# Patient Record
Sex: FEMALE | Race: WHITE | NOT HISPANIC OR LATINO | Employment: FULL TIME | URBAN - NONMETROPOLITAN AREA
[De-identification: names, ages, dates, MRNs, and addresses within clinical notes are randomized per-mention and may not be internally consistent; named-entity substitution may affect disease eponyms.]

---

## 2021-12-08 PROBLEM — I47.10 PAROXYSMAL SVT (SUPRAVENTRICULAR TACHYCARDIA): Status: ACTIVE | Noted: 2021-12-08

## 2021-12-08 PROBLEM — I47.1 PAROXYSMAL SVT (SUPRAVENTRICULAR TACHYCARDIA): Status: ACTIVE | Noted: 2021-12-08

## 2023-07-17 NOTE — H&P
This 23 year old patient presents for U/S and abnormal bleeding.      History of Present Illness:  1.  U/S   Pt states they have been bleeding for over a month, with clots.  Periods are typically irregular. Has been bleeding off and on for three months. Passing large clots.  Was placed on Depo provera when she was a senior that stopped her bleeeding.  Has tried several different kinds of OCPs that have not helped.  This has been a chronic problem for her.    US showed a thickened endometrium.      G0.    I think that given her obesity this is most likely from unopposed estrogen.  Given the fact that she has had 3 months of bleeding and her endometrium is still thickened I think she probably does need a D&C and we will place an IUD at the same time.  We discussed the procedure risks and benefits in detail.      2.  abnormal bleeding             Screening Tools  Other Screenings  Encounter Date Performed Date Screening Tool Score Severity/ Interpretation MDD Classification Scanned Document   07/13/2023 07/13/2023 Patient Health Questionnaire (PHQ-2) 0 Negative         Gynecologic History  07/13/2023 11:40 AM  Past Systemic History    Medical History  (Reviewed,updated)  Disease Onset Date Comments   Fracture, bone     PCOS         Surgical History/Management  (Reviewed,updated)  Management Date Comments   myringotomy     Tonsillectomy     EDG     Growth removed from Left ear       Diagnostics History  Status Study Ordered Completed Interpretation  Result / Report   completed TRANSVAGINAL US, NON-OB 07/13/2023 07/13/2023 See module     completed X-RAY ANKLE 3 VW 08/24/2022 08/24/2022   see xray ID#[id number]   completed X-RAY FOOT 3 VW 08/24/2022 08/24/2022   see xray #[id number]       Problem List  Problem List reviewed.   Problem Description Onset Date Chronic Clinical Status Notes   Paroxysmal supraventricular tachycardia 01/24/2022 Y     PCOS- polycystic ovary syndrome 06/23/2020 Y     Depression 06/23/2020 Y     Obesity  06/23/2020 Y     Hyperlipidemia 08/20/2020 Y     Vitamin D deficiency 08/20/2020 Y     Seasonal allergic rhinitis, unspecified trigger  N         Medications (active prior to today)  Medication Name Sig Description Start Date Stop Date Refilled Rx Elsewhere   Victoza 3-Jose 0.6 mg/0.1 mL (18 mg/3 mL) subcutaneous pen injector inject (1.2MG)  by subcutaneous route  every day 05/15/2023   N   buspirone 15 mg tablet take 1 tablet by oral route 3 times every day for anxiety 05/15/2023  05/15/2023 N   metoprolol tartrate 25 mg tablet take 1 tablet by oral route 2 times every day 05/15/2023  05/15/2023 N   spironolactone 100 mg tablet take 1 tablet by oral route  every day 05/15/2023  05/15/2023 N   hydroxyzine HCl 25 mg tablet take 1-2  tablet by oral route nightly as needed 05/15/2023   N   ASPIRIN LOW DOSE 81MG TABLET CHEWABLE CHEW 1 TABLET BY MOUTH EVERY DAY 05/15/2023  05/15/2023 N   Rexulti 1 mg tablet take 1 tablet by oral route  (previously failed Vraylar and Effexor) 05/18/2023   N   Zoloft 25 mg tablet take 1 tablet by oral route  every day 05/18/2023   N   Provera 10 mg tablet take 1 TID x 14 days, then 1 BID x 7 days, then 1 QD x 7 days 06/13/2023   N   Ozempic 0.25 mg or 0.5 mg (2 mg/3 mL) subcutaneous pen injector inject (0.5MG)  by subcutaneous route  every week on the same day of each week for E88.81 06/15/2023 07/13/2023 06/15/2023 N   ofloxacin 0.3 % ear drops instill 10 drop by otic route  every day into affected ear(s) 06/23/2023   N     Patient Status   Completed with information received for patient transitioning into care.   Completed with information received for patient in a summary of care record.     Medication Reconciliation  Medications reconciled today.    Medication Reviewed  Adherence Medication Name Sig Desc Elsewhere Status   taking as directed Victoza 3-Jose 0.6 mg/0.1 mL (18 mg/3 mL) subcutaneous pen injector inject (1.2MG)  by subcutaneous route  every day N Verified   taking as directed  buspirone 15 mg tablet take 1 tablet by oral route 3 times every day for anxiety N Verified   taking as directed metoprolol tartrate 25 mg tablet take 1 tablet by oral route 2 times every day N Verified   taking as directed spironolactone 100 mg tablet take 1 tablet by oral route  every day N Verified   taking as directed Rexulti 1 mg tablet take 1 tablet by oral route  (previously failed Vraylar and Effexor) N Verified   taking as directed ASPIRIN LOW DOSE 81MG TABLET CHEWABLE CHEW 1 TABLET BY MOUTH EVERY DAY N Verified   taking as directed hydroxyzine HCl 25 mg tablet take 1-2  tablet by oral route nightly as needed N Verified   taking as directed Provera 10 mg tablet take 1 TID x 14 days, then 1 BID x 7 days, then 1 QD x 7 days N Verified   taking as directed Zoloft 25 mg tablet take 1 tablet by oral route  every day N Verified   taking as directed Ozempic 0.25 mg or 0.5 mg (2 mg/3 mL) subcutaneous pen injector inject (0.5MG)  by subcutaneous route  every week on the same day of each week for E88.81 N Verified   taking as directed ofloxacin 0.3 % ear drops instill 10 drop by otic route  every day into affected ear(s) N Verified     Allergies  Ingredient Reaction (Severity) Medication Name Comment   NO KNOWN ALLERGIES            Family History    (Reviewed, updated)  Relationship Family Member Name  Age at Death Condition Onset Age Cause of Death       Family history of hypertension  N   Father    No Family history of No history of Migraines  N   Father    diabetes mellitus in first degree relative  N   Mother    Thyroid disease  N   M    Social History  (Reviewed, updated)  Tobacco use reviewed.    Preferred language is English.    The patient does not need an .      Marital Status/Family/Social Support  Marital status:      Tobacco use status: Current non-smoker.    Smoking status: Never smoker.    Tobacco Screening  Patient has never used tobacco. Patient has not used tobacco in the  last 30 days. Patient has not used smokeless tobacco in the last 30 days.    Smoking Status  Type Smoking Status Usage Per Day Years Used Pack Years Total Pack Years    Never smoker         Vaping Use  Screened for vaping? Yes  Status: Not a current user  Vaping cessation discussed: Yes    Tobacco/Vaping Exposure  No passive vaping exposure.  No passive smoke exposure.    Comments: No exposure    Alcohol  There is no history of alcohol use.     Caffeine  The patient uses caffeine: coffee and soda. - 3 cups a day.    Lifestyle  Moderate activity level.  Exercise includes walking.    Diet  healthy.  Home Environment/Safety  The home has smoke detectors.   Uses seat belts.       Review of Systems  Review of Systems  System Neg/Pos Details   Constitutional Negative Chills, Fatigue, Fever, Malaise, Night sweats, Weight gain and Weight loss.   ENMT Negative Ear drainage, Hearing loss, Nasal drainage, Otalgia, Sinus pressure and Sore throat.   Eyes Negative Eye discharge, Eye pain and Vision changes.   Respiratory Negative Chronic cough, Cough, Dyspnea, Known TB exposure and Wheezing.   Cardio Negative Chest pain, Claudication, Edema and Irregular heartbeat/palpitations.   GI Negative Abdominal pain, Blood in stool, Change in stool pattern, Constipation, Decreased appetite, Diarrhea, Heartburn, Nausea and Vomiting.    Negative Dysuria, Hematuria, Polyuria (Genitourinary), Urinary frequency, Urinary incontinence and Urinary retention.   Endocrine Negative Cold intolerance, Heat intolerance, Polydipsia and Polyphagia.   Neuro Negative Dizziness, Extremity weakness, Gait disturbance, Headache, Memory impairment, Numbness in extremity, Seizures and Tremors.   Psych Negative Anxiety, Depression and Insomnia.   Integumentary Negative Brittle hair, Brittle nails, Change in shape/size of mole(s), Hair loss, Hirsutism, Hives, Pruritus, Rash and Skin lesion.   MS Negative Back pain, Joint pain, Joint swelling, Muscle weakness and  Neck pain.   Hema/Lymph Negative Easy bleeding, Easy bruising and Lymphadenopathy.   Allergic/Immuno Negative Contact allergy, Environmental allergies, Food allergies and Seasonal allergies.   Reproductive Positive Irregular menses.   Reproductive Negative Breast discharge and Breast lumps.     Vital Signs     Time BP mm/Hg Pulse /min Resp /min Temp F Ht ft Ht in Ht cm Wt lb Wt kg BMI kg/m2 BSA m2 O2 Sat%   11:00 /85 78 18 98.4 5 7 170.18 353.6 160.39 55.38 2.75 98     Measured By  Time Measured by   11:00 AM Sandra Vazquez       Screening Summary  The following were reviewed: nutrition, tobacco use, alcohol use, caffeine use and drugs of abuse        Physical Exam  Exam Findings Details   Constitutional Normal Well developed.   Respiratory Normal Inspection - Normal.   Abdomen Normal Anterior palpation -  No rebound. No abdominal tenderness. No hepatic enlargement. No hernia.   Genitourinary * Pelvic deferred.   Psychiatric Normal Orientation - Oriented to time, place, person & situation. Appropriate mood and affect.       Completed Orders (This Visit)  Order Details Reason Side Interpretation Result Additional Info Initial Treatment Date Region   Referrals: Oral Maxillofacial Surgery. [facility]. Location: [phone]. Evaluate and treat  Ext, Erupt Tth/Expsd Root-Elev &/Or Forceps Remvl  (Tooth: 3)     Ext, Erupt Tth/Expsd Root-Elev &/Or Forceps Remvl  (Tooth: 14)    DOS: 04/05/2023 05/25/2023    Patient Health Questionnaire (PHQ-2)    Negative 0      Prescribed activity/exercise education           Dietary management education, guidance, and counseling           TRANSVAGINAL US, NON-OB    See module       PREGNANCY URINE    negative       Pre-Visit Planning             Assessment/Plan  # Detail Type Description    Assessment Body mass index [BMI] 50.0-59.9, adult (Z68.43).         2. Assessment Menorrhagia with irregular cycle (N92.1).    Plan Orders The  patient had the following order(s) completed today: TRANSVAGINAL US, NON-OB. Obtained on 07/13/2023, Interpretation: See module. She will be scheduled for IUD INSERTION, Next test date is on. Clinical information/comments: The surgeon scheduled is Christopher Cruz DO.  PAT:  7/17/23 @ 8:00AM OUT PT SX and HYSTEROSCOPY, W/WO D&C on, Next test date is 07/19/2023. Clinical information/comments: The surgeon scheduled is Christopher Cruz DO.  PAT:  7/17/23 @ 8:00AM OUT PT SX.         3. Assessment Irregular menstruation, unspecified (N92.6).    Plan Orders The patient had the following order(s) completed today: PREGNANCY URINE. Obtained on 07/13/2023, Interpretation: negative.         4. Assessment IUD contraception (Z97.5).         5. Assessment Dietary counseling and surveillance (Z71.3).    Plan Orders Today's instructions / counseling include(s) Dietary management education, guidance, and counseling and Prescribed activity/exercise education .         6. Other Orders Orders not associated to today's assessments.    Plan Orders The patient had the following order(s) completed today: Patient Health Questionnaire (PHQ-2). Interpretation: Negative, Result details: 0.            Diagnostic History Entered Today  Performed Study Interpretation Result   07/13/2023 Pre-Visit Planning       Clinical Guidelines (Reviewed, updated)  Guidelines Status Due Action Last Addressed Comments   Depression screening completed 07/13/2024 Completed on 07/13/2023 07/13/2023    Lipid panel completed 06/02/2040 Completed on 06/14/2023 06/14/2023    PRAPARE Assessment completed 10/27/2023 Completed on 10/27/2022 10/27/2022    Pre-Visit Planning completed 07/20/2023 Completed on 07/13/2023 07/13/2023    Tdap completed  Completed on 06/23/2020 06/23/2020      Clinical Guidelines (Declined or Excluded)  Guideline Status Due Action Last Addressed Comments   Influenza Vaccine Declined 09/27/2022 refused  discussed today & pt declined... [facility] 9/27/22    PAP Declined 09/27/2022 refused  discussed today & pt declined... [facility] 9/27/22   Influenza Vaccine Declined 06/30/2022 refused     PAP Declined 06/30/2022 refused  Declined 6/30/22 MK   Chlamydia Screening Declined 06/30/2022 refused     Hepatitis C screening Declined 06/30/2022 refused     Influenza (3yrs and older) Declined 02/25/2020 Completed on 02/25/2019 02/25/2019        Patient Education  # Patient Education   1. A Healthy Lifestyle: Care Instructions       Medications (Added, Continued or Stopped today)  Start Date Medication Directions PRN Status PRN Reason Instruction Stop Date   05/15/2023 ASPIRIN LOW DOSE 81MG TABLET CHEWABLE CHEW 1 TABLET BY MOUTH EVERY DAY N      05/15/2023 buspirone 15 mg tablet take 1 tablet by oral route 3 times every day for anxiety N anxiety     05/15/2023 hydroxyzine HCl 25 mg tablet take 1-2  tablet by oral route nightly as needed N      05/15/2023 metoprolol tartrate 25 mg tablet take 1 tablet by oral route 2 times every day N      06/23/2023 ofloxacin 0.3 % ear drops instill 10 drop by otic route  every day into affected ear(s) N      06/15/2023 Ozempic 0.25 mg or 0.5 mg (2 mg/3 mL) subcutaneous pen injector inject (0.5MG)  by subcutaneous route  every week on the same day of each week for E88.81 N E88.81  07/13/2023 06/13/2023 Provera 10 mg tablet take 1 TID x 14 days, then 1 BID x 7 days, then 1 QD x 7 days N      05/18/2023 Rexulti 1 mg tablet take 1 tablet by oral route  (previously failed Vraylar and Effexor) N      05/15/2023 spironolactone 100 mg tablet take 1 tablet by oral route  every day N      05/15/2023 Victoza 3-Jose 0.6 mg/0.1 mL (18 mg/3 mL) subcutaneous pen injector inject (1.2MG)  by subcutaneous route  every day N      05/18/2023 Zoloft 25 mg tablet take 1 tablet by oral route  every day N        Surgery Scheduled  Surgery Details #1:  The patient has a diagnosis of: Menorrhagia with irregular cycle, N92.1  She is scheduled for: HYSTEROSCOPY, W/WO D&C,  IUD INSERTION, to be performed by Christopher Cruz DO    Additional Details:  Patient's Last BMI: 55.38       Orders/Procedures/Instructions/Educations  Office Labs  Order     PREGNANCY URINE negative      Office Procedures/Services  HYSTEROSCOPY, W/WO D&C   IUD INSERTION     Diagnostics/Procedures Completed This Encounter  TRANSVAGINAL US, NON-OB See module        Counseling / Educational Factors  Counseling / educational factors reviewed.

## 2023-07-19 ENCOUNTER — HOSPITAL ENCOUNTER (OUTPATIENT)
Facility: HOSPITAL | Age: 23
Setting detail: HOSPITAL OUTPATIENT SURGERY
Discharge: HOME | End: 2023-07-19
Attending: OBSTETRICS & GYNECOLOGY | Admitting: OBSTETRICS & GYNECOLOGY
Payer: COMMERCIAL

## 2023-07-19 ENCOUNTER — APPOINTMENT (OUTPATIENT)
Dept: GENERAL RADIOLOGY | Facility: HOSPITAL | Age: 23
End: 2023-07-19
Payer: COMMERCIAL

## 2023-07-19 VITALS
HEART RATE: 69 BPM | DIASTOLIC BLOOD PRESSURE: 79 MMHG | SYSTOLIC BLOOD PRESSURE: 118 MMHG | OXYGEN SATURATION: 96 % | RESPIRATION RATE: 14 BRPM | WEIGHT: 293 LBS | BODY MASS INDEX: 47.09 KG/M2 | TEMPERATURE: 98.1 F | HEIGHT: 66 IN

## 2023-07-19 DIAGNOSIS — N92.1 MENORRHAGIA WITH IRREGULAR CYCLE: ICD-10-CM

## 2023-07-19 LAB
ABO GROUP BLD: NORMAL
B-HCG UR QL: NEGATIVE
EXPIRATION DATE: NORMAL
INTERNAL NEGATIVE CONTROL: NEGATIVE
INTERNAL POSITIVE CONTROL: POSITIVE
Lab: NORMAL
RH BLD: NEGATIVE

## 2023-07-19 PROCEDURE — 86901 BLOOD TYPING SEROLOGIC RH(D): CPT

## 2023-07-19 PROCEDURE — 81025 URINE PREGNANCY TEST: CPT | Performed by: ANESTHESIOLOGY

## 2023-07-19 PROCEDURE — 25010000002 FENTANYL CITRATE (PF) 50 MCG/ML SOLUTION: Performed by: NURSE ANESTHETIST, CERTIFIED REGISTERED

## 2023-07-19 PROCEDURE — 25010000002 ONDANSETRON PER 1 MG: Performed by: NURSE ANESTHETIST, CERTIFIED REGISTERED

## 2023-07-19 PROCEDURE — 86900 BLOOD TYPING SEROLOGIC ABO: CPT

## 2023-07-19 PROCEDURE — 25010000002 KETOROLAC TROMETHAMINE PER 15 MG: Performed by: NURSE ANESTHETIST, CERTIFIED REGISTERED

## 2023-07-19 DEVICE — IUD LEVONORGESTREL MIRENA 52MG: Type: IMPLANTABLE DEVICE | Site: CERVIX | Status: FUNCTIONAL

## 2023-07-19 RX ORDER — FENTANYL CITRATE 50 UG/ML
50 INJECTION, SOLUTION INTRAMUSCULAR; INTRAVENOUS
Status: DISCONTINUED | OUTPATIENT
Start: 2023-07-19 | End: 2023-07-19 | Stop reason: HOSPADM

## 2023-07-19 RX ORDER — MIDAZOLAM HYDROCHLORIDE 1 MG/ML
1 INJECTION INTRAMUSCULAR; INTRAVENOUS
Status: DISCONTINUED | OUTPATIENT
Start: 2023-07-19 | End: 2023-07-19 | Stop reason: HOSPADM

## 2023-07-19 RX ORDER — OXYCODONE HYDROCHLORIDE AND ACETAMINOPHEN 5; 325 MG/1; MG/1
1 TABLET ORAL ONCE AS NEEDED
Status: COMPLETED | OUTPATIENT
Start: 2023-07-19 | End: 2023-07-19

## 2023-07-19 RX ORDER — SODIUM CHLORIDE 0.9 % (FLUSH) 0.9 %
10 SYRINGE (ML) INJECTION EVERY 12 HOURS SCHEDULED
Status: DISCONTINUED | OUTPATIENT
Start: 2023-07-19 | End: 2023-07-19 | Stop reason: HOSPADM

## 2023-07-19 RX ORDER — IBUPROFEN 600 MG/1
600 TABLET ORAL EVERY 6 HOURS PRN
Qty: 30 TABLET | Refills: 0 | Status: SHIPPED | OUTPATIENT
Start: 2023-07-19 | End: 2023-08-18

## 2023-07-19 RX ORDER — SODIUM CHLORIDE 0.9 % (FLUSH) 0.9 %
10 SYRINGE (ML) INJECTION AS NEEDED
Status: DISCONTINUED | OUTPATIENT
Start: 2023-07-19 | End: 2023-07-19 | Stop reason: HOSPADM

## 2023-07-19 RX ORDER — KETOROLAC TROMETHAMINE 30 MG/ML
30 INJECTION, SOLUTION INTRAMUSCULAR; INTRAVENOUS EVERY 6 HOURS PRN
Status: COMPLETED | OUTPATIENT
Start: 2023-07-19 | End: 2023-07-19

## 2023-07-19 RX ORDER — SODIUM CHLORIDE 9 MG/ML
40 INJECTION, SOLUTION INTRAVENOUS AS NEEDED
Status: DISCONTINUED | OUTPATIENT
Start: 2023-07-19 | End: 2023-07-19 | Stop reason: HOSPADM

## 2023-07-19 RX ORDER — IPRATROPIUM BROMIDE AND ALBUTEROL SULFATE 2.5; .5 MG/3ML; MG/3ML
3 SOLUTION RESPIRATORY (INHALATION) ONCE AS NEEDED
Status: DISCONTINUED | OUTPATIENT
Start: 2023-07-19 | End: 2023-07-19 | Stop reason: HOSPADM

## 2023-07-19 RX ORDER — MAGNESIUM HYDROXIDE 1200 MG/15ML
LIQUID ORAL AS NEEDED
Status: DISCONTINUED | OUTPATIENT
Start: 2023-07-19 | End: 2023-07-19 | Stop reason: HOSPADM

## 2023-07-19 RX ORDER — ONDANSETRON 2 MG/ML
4 INJECTION INTRAMUSCULAR; INTRAVENOUS AS NEEDED
Status: DISCONTINUED | OUTPATIENT
Start: 2023-07-19 | End: 2023-07-19 | Stop reason: HOSPADM

## 2023-07-19 RX ORDER — SODIUM CHLORIDE, SODIUM LACTATE, POTASSIUM CHLORIDE, CALCIUM CHLORIDE 600; 310; 30; 20 MG/100ML; MG/100ML; MG/100ML; MG/100ML
100 INJECTION, SOLUTION INTRAVENOUS ONCE AS NEEDED
Status: DISCONTINUED | OUTPATIENT
Start: 2023-07-19 | End: 2023-07-19 | Stop reason: HOSPADM

## 2023-07-19 RX ORDER — SODIUM CHLORIDE, SODIUM LACTATE, POTASSIUM CHLORIDE, CALCIUM CHLORIDE 600; 310; 30; 20 MG/100ML; MG/100ML; MG/100ML; MG/100ML
125 INJECTION, SOLUTION INTRAVENOUS ONCE
Status: COMPLETED | OUTPATIENT
Start: 2023-07-19 | End: 2023-07-19

## 2023-07-19 RX ADMIN — OXYCODONE HYDROCHLORIDE AND ACETAMINOPHEN 1 TABLET: 5; 325 TABLET ORAL at 11:30

## 2023-07-19 RX ADMIN — ONDANSETRON 4 MG: 2 INJECTION INTRAMUSCULAR; INTRAVENOUS at 11:41

## 2023-07-19 RX ADMIN — KETOROLAC TROMETHAMINE 30 MG: 30 INJECTION, SOLUTION INTRAMUSCULAR; INTRAVENOUS at 11:35

## 2023-07-19 RX ADMIN — SODIUM CHLORIDE, POTASSIUM CHLORIDE, SODIUM LACTATE AND CALCIUM CHLORIDE 125 ML/HR: 600; 310; 30; 20 INJECTION, SOLUTION INTRAVENOUS at 08:11

## 2023-07-19 RX ADMIN — FENTANYL CITRATE 50 MCG: 50 INJECTION, SOLUTION INTRAMUSCULAR; INTRAVENOUS at 11:08

## 2023-07-19 RX ADMIN — FENTANYL CITRATE 50 MCG: 50 INJECTION, SOLUTION INTRAMUSCULAR; INTRAVENOUS at 11:35

## 2023-07-19 NOTE — OP NOTE
DILATATION AND CURETTAGE HYSTEROSCOPY  Procedure Note    Jelly Weeks  7/19/2023    Pre-op Diagnosis:   Metrorrhagia  Morbid obesity  Thickened endometrium on ultrasound  Contraception    Post-op Diagnosis:     Metrorrhagia  Morbid obesity  Thickened endometrium on ultrasound  Contraception    Procedure(s):  Hysteroscopy  Dilation and curettage  Mirena IUD insertion    Surgeon(s):  Christopher Cruz DO    Anesthesia: General    Estimated Blood Loss: minimal    Specimens:  Order Name Source Comment Collection Info Order Time   ABO/RH SPECIMEN VERIFICATION PREOP   Collected By: Mague Eckert, PCT 7/19/2023  7:34 AM   TISSUE EXAM, [facility] ([facility]) Endometrial Curettings  Collected By: Christopher Cruz DO 7/19/2023 10:40 AM     How many specimens?   1          Release to patient   Routine Release              Procedure:   The patient was taken to the operating room after informed written consent was obtained.  A timeout procedure was performed. The patient was placed under general anesthesia and then prepared and draped in the dorsal lithotomy position under sterile conditions.  We placed a speculum into the vagina. We grasped the anterior lip of the cervix with a toothed tenaculum. We then sounded the uterus, dilated the cervix and inserted the hysteroscope. The cervix and uterus were normal set that the endometrium was fairly thickened.  There were no irregularities.. Both tubal ostia were visualized. We then removed the hysteroscope. We then gently dilated the cervix some more and did a gentle curettage in all quadrants of the uterus until a fine gritty texture was noted. The endometrial curettings were sent to pathology as a specimen.    Next we inserted the IUD.  We sounded the uterus to 9 cm.  We inserted the IUD per the 's instructions and deployed it.  We trimmed the strings to 3 cm.    There were no complications with the procedure and all the counts were correct.      Findings: The endometrium was thickened but regular    Complications: none    Grafts or Implants: Mirena IUD Lot number [id number]    Christopher Cruz, DO     Date: 7/19/2023  Time: 10:49 EDT

## 2023-07-20 LAB — REF LAB TEST METHOD: NORMAL

## 2024-04-11 ENCOUNTER — OFFICE VISIT (OUTPATIENT)
Age: 24
End: 2024-04-11
Payer: COMMERCIAL

## 2024-04-11 VITALS — WEIGHT: 293 LBS | BODY MASS INDEX: 47.09 KG/M2 | HEIGHT: 66 IN

## 2024-04-11 DIAGNOSIS — L91.0 KELOID SCAR: Primary | ICD-10-CM

## 2024-04-11 RX ORDER — CARIPRAZINE 1.5 MG/1
1.5 CAPSULE, GELATIN COATED ORAL DAILY
COMMUNITY
Start: 2024-04-09

## 2024-04-11 RX ORDER — BUPROPION HYDROCHLORIDE 150 MG/1
150 TABLET ORAL DAILY
COMMUNITY

## 2024-04-12 NOTE — PROGRESS NOTES
Date of Service: 4/12/2024    Subjective   Jelly Weeks is a 23 y.o. female is being in consultation today at the request of Lashanda Turner APRN    Chief Complaint: left ear keloid scar     Jelly Weeks is a 23 y.o. female who presents with a keloid scar to her left ear. She previously had a keloid scar removed in the same area and it has grown back significantly worsened. She is interested in discussing removal.     Past Medical History:   Diagnosis Date    Anxiety     Depression     PCOS (polycystic ovarian syndrome)     SVT (supraventricular tachycardia)        Past Surgical History:   Procedure Laterality Date    D & C HYSTEROSCOPY N/A 7/19/2023    Procedure: DILATATION AND CURETTAGE HYSTEROSCOPY IUD INSERTION;  Surgeon: Christopher Cruz DO;  Location: [facility];  Service: Obstetrics/Gynecology;  Laterality: N/A;    EXCISION LESION Left     ear    MYRINGOTOMY W/ TUBES      TONSILLECTOMY           Family History   Problem Relation Age of Onset    Thyroid disease Mother     Diabetes Father     Asthma Sister     No Known Problems Sister     No Known Problems Brother          Social History     Socioeconomic History    Marital status:    Tobacco Use    Smoking status: Never    Smokeless tobacco: Never   Vaping Use    Vaping status: Never Used   Substance and Sexual Activity    Alcohol use: Never    Drug use: Never    Sexual activity: Defer       Review of Systems   Pertinent items are noted in HPI     Constitutional: No fevers, chills or malaise. No unintentional weight loss   Eyes: Denies visual changes    Cardiovascular: Denies chest pain, palpitations   Respiratory: Denies cough or shortness of breath   Abdominal/Gastrointestinal: No abdominal pain, no nausea/vomiting   Genitourinary: Denies dysuria or hematuria   Musculoskeletal: Denies any chronic joint aches, pains or deformities              Skin: No lesions or rashes   Psychiatric: No recent mood changes   Neurologic:  "No paresthesias or loss of function        Objective       Physical Exam:      04/11/24  1044   Weight: (!) 159 kg (351 lb)    Body mass index is 56.68 kg/m².  Constitution: Ht 167.6 cm (65.98\")   Wt (!) 159 kg (351 lb)   BMI 56.68 kg/m²  . No acute distress  Head: Normocephalic, atraumatic.   Eyes: Aligned without strabismus. Conjunctivae noninjected   Ears, Nose, Mouth:no lesions noted  CV: Regular rate and rhythm   Respiratory: Symmetric chest expansion. No respiratory distress.   Gastrointestinal:  Soft, nontender, nondistended   Skin:  No cyanosis, clubbing or edema bilaterally  Neurologic: No gross deficits.  Alert and oriented x3  Psychiatric: Normal mood      Assessment   Diagnoses and all orders for this visit:    1. Keloid scar (Primary)  -     Ambulatory Referral to Plastic Surgery      Jelly Weeks is a 23 y.o. female with a recurrent keloid scar on her left ear. Unfortunately in the setting of recurrence and significant worsening, she will need to be evaluated by plastic surgery for adjuncts in scar removal. I have placed a removal to [facility] for evaluation.        Mary Anne Hall MD  [facility]- General Surgery    "

## (undated) DEVICE — GLV SURG PREMIERPRO MIC LTX PF SZ8 BRN

## (undated) DEVICE — COR MINOR LITHOTOMY: Brand: MEDLINE INDUSTRIES, INC.

## (undated) DEVICE — CYSTO/BLADDER IRRIGATION SET, REGULATING CLAMP

## (undated) DEVICE — GOWN,REINF,POLY,ECL,PP SLV,XXL: Brand: MEDLINE